# Patient Record
Sex: MALE | Employment: UNEMPLOYED | URBAN - METROPOLITAN AREA
[De-identification: names, ages, dates, MRNs, and addresses within clinical notes are randomized per-mention and may not be internally consistent; named-entity substitution may affect disease eponyms.]

---

## 2019-06-29 ENCOUNTER — HOSPITAL ENCOUNTER (EMERGENCY)
Facility: CLINIC | Age: 24
Discharge: HOME | End: 2019-06-30
Attending: EMERGENCY MEDICINE | Admitting: EMERGENCY MEDICINE

## 2019-06-29 VITALS
RESPIRATION RATE: 16 BRPM | DIASTOLIC BLOOD PRESSURE: 83 MMHG | SYSTOLIC BLOOD PRESSURE: 131 MMHG | TEMPERATURE: 98.7 F | HEART RATE: 77 BPM | OXYGEN SATURATION: 100 %

## 2019-06-29 DIAGNOSIS — L03.031 PARONYCHIA OF TOE OF RIGHT FOOT: ICD-10-CM

## 2019-06-29 DIAGNOSIS — L60.0 INGROWING NAIL, RIGHT GREAT TOE: ICD-10-CM

## 2019-06-29 PROCEDURE — 99283 EMERGENCY DEPT VISIT LOW MDM: CPT | Mod: 25

## 2019-06-29 PROCEDURE — 11750 EXCISION NAIL&NAIL MATRIX: CPT | Mod: T5

## 2019-06-29 RX ORDER — BUPIVACAINE HYDROCHLORIDE 5 MG/ML
INJECTION, SOLUTION PERINEURAL
Status: DISCONTINUED
Start: 2019-06-29 | End: 2019-06-30 | Stop reason: HOSPADM

## 2019-06-29 RX ORDER — CEPHALEXIN 500 MG/1
500 CAPSULE ORAL 4 TIMES DAILY
Qty: 28 CAPSULE | Refills: 0 | Status: SHIPPED | OUTPATIENT
Start: 2019-06-29 | End: 2019-07-06

## 2019-06-29 NOTE — ED AVS SNAPSHOT
[facility] Emergency Department  [address]  [facility] [address]  Phone:  [phone]  Fax:  [phone]                                    Jose Lindquist   MRN: [id number]    Department:  [facility] Emergency Department   Date of Visit:  6/29/2019           After Visit Summary Signature Page    I have received my discharge instructions, and my questions have been answered. I have discussed any challenges I see with this plan with the nurse or doctor.    ..........................................................................................................................................  Patient/Patient Representative Signature      ..........................................................................................................................................  Patient Representative Print Name and Relationship to Patient    ..................................................               ................................................  Date                                   Time    ..........................................................................................................................................  Reviewed by Signature/Title    ...................................................              ..............................................  Date                                               Time          22EPIC Rev 08/18

## 2019-06-30 NOTE — ED PROVIDER NOTES
History     Chief Complaint:  Toe Pain    Salvadorean interpretation provided via J[employer]ber iPad     HPI   Jose Lindquist is a 23 year old male who presents with toe pain. The patient reports an ingrown toe nail for the past month, today the pain and pus was significant, prompting his presentation. The patient reports fever about 2 weeks ago, but since resolved. The patient notes that he does not have a primary doctor.    Allergies:  No Known Drug Allergies    Medications:    Medications reviewed. No current medications.     Past Medical History:    Medical history reviewed. No pertinent medical history.    Past Surgical History:    Surgical history reviewed. No pertinent surgical history.    Family History:    Family history reviewed. No pertinent family history.     Social History:  Presents to the ED with his friend     Review of Systems   Musculoskeletal:        Right toe pain   Skin: redness of toe  Neuro: no numbness  Gen: no fevers  CV: neg      Physical Exam     Patient Vitals for the past 24 hrs:   BP Temp Temp src Pulse Resp SpO2   06/29/19 2043 131/83 98.7  F (37.1  C) Oral 77 16 100 %       Physical Exam  Right great toe: ingrown toenail to the medial aspect of toe, redness and pus to medial toe, no tenderness over the plantar surface of toe  Neuro: normal sensation foot  CV: normal cap refill RLE  Skin: redness of medial cuticle    Emergency Department Course   Procedures:  Toe Nail Removal    Provider: Naila Perez MD   Indication: infected ingrown toe nail  Anesthesia: digital block with5 ml ofbupivicaine  Description of Procedure: The toe was prepped with betadine and draped in usual manner. Anesthesia was achieved.  The medial half of the toenail was removed to the level of the nail bed, paronychia was opened and pus was expressed,wound was irrigated with saline, bacitracin and adaptec applied. The patient tolerated the procedure well, no complications.        Interventions:  2305: 0.5% Marcaine injection    Emergency Department Course:  Past medical records, nursing notes, and vitals reviewed.  2259: I performed an exam of the patient and obtained history, as documented above.    2341: I performed an ingrown toenail procedure on the patient, procedure note above.     Patient discharged home with instructions regarding supportive care, medications, and reasons to return. The importance of close follow-up was reviewed.     Impression & Plan    Medical Decision Making:  Jose Lindquist is a 23 year old male who presents for evaluation of pain in the right great toenail. This is consistent with an ingrown toenail. After excision of the affected toenail, the area is decompressed and all visible infection removed. Dressings applied and will follow up with PCP for further management. No signs of serious foot infection at this point to warrant hospitalization, blood work, consultation. No signs of necrotizing skin infection or abscess in soft tissues of the foot. Stable for discharge.     Diagnosis:   ICD-10-CM    Ingrowing nail, right great toe L60.0        Disposition: Discharged to home    Discharge Medications:  Medication List   Started    cephALEXin 500 MG capsule  Commonly known as:  KEFLEX  500 mg, Oral, 4 TIMES DAILY       IKathi, am serving as a scribe at 10:59 PM on 6/29/2019 to document services personally performed by Naila Perez MD based on my observations and the provider's statements to me.     Kathi Rm  6/29/2019   [facility] EMERGENCY DEPARTMENT       Naila Perez MD  07/01/19 2839

## 2019-06-30 NOTE — ED NOTES
Bacitracin ointment and a non-adherent dressing applied to right big toe. Follow up care provided via . Patient verbalizes understanding.